# Patient Record
Sex: MALE | URBAN - METROPOLITAN AREA
[De-identification: names, ages, dates, MRNs, and addresses within clinical notes are randomized per-mention and may not be internally consistent; named-entity substitution may affect disease eponyms.]

---

## 2017-03-06 NOTE — PROCEDURE NOTE: CLINICAL
PROCEDURE NOTE: Epilation Left Lower Lid. Diagnosis: Trichiasis. Aberrant lashes removed from * lid(s) using microforcep. Patient tolerated procedure well. There were no complications. Post-op instructions given. Bhupendra Strickland

## 2018-05-24 ENCOUNTER — IMPORTED ENCOUNTER (OUTPATIENT)
Dept: URBAN - METROPOLITAN AREA CLINIC 9 | Facility: CLINIC | Age: 43
End: 2018-05-24

## 2019-01-04 ENCOUNTER — IMPORTED ENCOUNTER (OUTPATIENT)
Dept: URBAN - METROPOLITAN AREA CLINIC 9 | Facility: CLINIC | Age: 44
End: 2019-01-04

## 2021-02-24 NOTE — PATIENT DISCUSSION
Removed several eyelashes OS greater than OD lower lids, utilize artificial tears 4 times per day, compleete examination in June, follow-up PRN.

## 2021-08-18 NOTE — PATIENT DISCUSSION
history of esop with left hyper at initial visit June 2020. 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

## 2021-08-18 NOTE — PATIENT DISCUSSION
DVG looking right 4.0 base out and 1.5 base down OS, looking left 4.0 base out with 2.0 base down OS. Straight position 4.0 base out and 1.5 base down OS. Double vision eliminated with 3.0 base out and 1.5 base down OS. Remake eyeglasses with prism.

## 2021-08-18 NOTE — PATIENT DISCUSSION
picked up new eyeglasses Monday noticed double vision yesterday while driving. hx esop w Aleksey Common .  first prescription eyeglasses ever.

## 2021-08-18 NOTE — PATIENT DISCUSSION
recurring Removed several eyelashes OS greater than OD lower lids, utilize artificial tears 4 times per day,.

## 2021-09-09 NOTE — PATIENT DISCUSSION
history of esop with left hyper at initial visit June 2020. 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 doing well with prism eyeglasses.

## 2021-10-18 ASSESSMENT — VISUAL ACUITY
OD_CC: 20/20 SN
OD_CC: 20/30 SN
OS_CC: 20/25 SN
OS_CC: 20/20 SN

## 2021-10-18 ASSESSMENT — TONOMETRY
OD_IOP_MMHG: 12
OS_IOP_MMHG: 15

## 2022-02-28 NOTE — PATIENT DISCUSSION
wants the problem fixed permanently. Has been to Dr Yves Trevino and treatment was not 100% effective according to patient.   Oculoplastic consultation Dr Migue Willis office PRN vs epilation here in one-2 months.  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

## 2022-10-07 ENCOUNTER — ESTABLISHED PATIENT (OUTPATIENT)
Dept: URBAN - METROPOLITAN AREA CLINIC 6 | Facility: CLINIC | Age: 47
End: 2022-10-07

## 2022-10-07 DIAGNOSIS — H04.123: ICD-10-CM

## 2022-10-07 DIAGNOSIS — H53.19: ICD-10-CM

## 2022-10-07 PROCEDURE — 92310B CONTACT LEN 60

## 2022-10-07 PROCEDURE — 92004 COMPRE OPH EXAM NEW PT 1/>: CPT

## 2022-10-07 ASSESSMENT — VISUAL ACUITY
OD_CC: 20/20
OS_CC: 20/20

## 2022-10-07 ASSESSMENT — TONOMETRY
OS_IOP_MMHG: 13
OD_IOP_MMHG: 11